# Patient Record
Sex: MALE | Race: WHITE | URBAN - METROPOLITAN AREA
[De-identification: names, ages, dates, MRNs, and addresses within clinical notes are randomized per-mention and may not be internally consistent; named-entity substitution may affect disease eponyms.]

---

## 2017-09-29 ENCOUNTER — OFFICE VISIT (OUTPATIENT)
Dept: FAMILY MEDICINE | Facility: CLINIC | Age: 42
End: 2017-09-29
Payer: COMMERCIAL

## 2017-09-29 ENCOUNTER — RADIANT APPOINTMENT (OUTPATIENT)
Dept: GENERAL RADIOLOGY | Facility: CLINIC | Age: 42
End: 2017-09-29
Attending: FAMILY MEDICINE
Payer: COMMERCIAL

## 2017-09-29 VITALS
HEIGHT: 71 IN | TEMPERATURE: 97.9 F | WEIGHT: 236 LBS | SYSTOLIC BLOOD PRESSURE: 132 MMHG | DIASTOLIC BLOOD PRESSURE: 94 MMHG | BODY MASS INDEX: 33.04 KG/M2 | HEART RATE: 76 BPM

## 2017-09-29 DIAGNOSIS — E66.09 NON MORBID OBESITY DUE TO EXCESS CALORIES: ICD-10-CM

## 2017-09-29 DIAGNOSIS — S93.401A SPRAIN OF RIGHT ANKLE, UNSPECIFIED LIGAMENT, INITIAL ENCOUNTER: ICD-10-CM

## 2017-09-29 DIAGNOSIS — Z72.0 TOBACCO ABUSE: ICD-10-CM

## 2017-09-29 DIAGNOSIS — D29.0 NEVUS OF SHAFT OF PENIS: ICD-10-CM

## 2017-09-29 DIAGNOSIS — Z23 NEED FOR VACCINATION: ICD-10-CM

## 2017-09-29 DIAGNOSIS — M61.40 HETEROTOPIC CALCIFICATION, POSTOPERATIVE: ICD-10-CM

## 2017-09-29 DIAGNOSIS — Z00.00 ROUTINE GENERAL MEDICAL EXAMINATION AT A HEALTH CARE FACILITY: Primary | ICD-10-CM

## 2017-09-29 DIAGNOSIS — R03.0 ELEVATED BLOOD PRESSURE READING WITHOUT DIAGNOSIS OF HYPERTENSION: ICD-10-CM

## 2017-09-29 DIAGNOSIS — N52.9 ERECTILE DYSFUNCTION, UNSPECIFIED ERECTILE DYSFUNCTION TYPE: ICD-10-CM

## 2017-09-29 DIAGNOSIS — Z13.6 CARDIOVASCULAR SCREENING; LDL GOAL LESS THAN 130: ICD-10-CM

## 2017-09-29 DIAGNOSIS — Z23 NEED FOR PROPHYLACTIC VACCINATION AND INOCULATION AGAINST INFLUENZA: ICD-10-CM

## 2017-09-29 DIAGNOSIS — M96.89 HETEROTOPIC CALCIFICATION, POSTOPERATIVE: ICD-10-CM

## 2017-09-29 PROCEDURE — 90471 IMMUNIZATION ADMIN: CPT | Performed by: FAMILY MEDICINE

## 2017-09-29 PROCEDURE — 90732 PPSV23 VACC 2 YRS+ SUBQ/IM: CPT | Performed by: FAMILY MEDICINE

## 2017-09-29 PROCEDURE — 36415 COLL VENOUS BLD VENIPUNCTURE: CPT | Performed by: FAMILY MEDICINE

## 2017-09-29 PROCEDURE — 99213 OFFICE O/P EST LOW 20 MIN: CPT | Mod: 25 | Performed by: FAMILY MEDICINE

## 2017-09-29 PROCEDURE — 90715 TDAP VACCINE 7 YRS/> IM: CPT | Performed by: FAMILY MEDICINE

## 2017-09-29 PROCEDURE — 84443 ASSAY THYROID STIM HORMONE: CPT | Performed by: FAMILY MEDICINE

## 2017-09-29 PROCEDURE — 90472 IMMUNIZATION ADMIN EACH ADD: CPT | Performed by: FAMILY MEDICINE

## 2017-09-29 PROCEDURE — 99386 PREV VISIT NEW AGE 40-64: CPT | Mod: 25 | Performed by: FAMILY MEDICINE

## 2017-09-29 PROCEDURE — 90686 IIV4 VACC NO PRSV 0.5 ML IM: CPT | Performed by: FAMILY MEDICINE

## 2017-09-29 PROCEDURE — 80061 LIPID PANEL: CPT | Performed by: FAMILY MEDICINE

## 2017-09-29 PROCEDURE — 80053 COMPREHEN METABOLIC PANEL: CPT | Performed by: FAMILY MEDICINE

## 2017-09-29 PROCEDURE — 73610 X-RAY EXAM OF ANKLE: CPT | Mod: RT

## 2017-09-29 RX ORDER — SILDENAFIL CITRATE 20 MG/1
TABLET ORAL
Qty: 30 TABLET | Refills: 11 | Status: SHIPPED | OUTPATIENT
Start: 2017-09-29

## 2017-09-29 NOTE — MR AVS SNAPSHOT
After Visit Summary   9/29/2017    Richard Bella    MRN: [id number]           Patient Information     Date Of Birth          1975        Visit Information        Provider Department      9/29/2017 2:30 PM Ryne Thomason MD [facility]        Today's Diagnoses     Routine general medical examination at a health care facility    -  1    Non morbid obesity due to excess calories        Tobacco abuse        CARDIOVASCULAR SCREENING; LDL GOAL LESS THAN 130        Sprain of right ankle, unspecified ligament, initial encounter        Erectile dysfunction, unspecified erectile dysfunction type        Need for vaccination        Need for prophylactic vaccination and inoculation against influenza        Heterotopic calcification, postoperative        Elevated blood pressure reading without diagnosis of hypertension        Melanocytic nevus of hip, unspecified laterality          Care Instructions      Preventive Health Recommendations  Male Ages 40 to 49    Yearly exam:             See your health care provider every year in order to  o   Review health changes.   o   Discuss preventive care.    o   Review your medicines if your doctor has prescribed any.    You should be tested each year for STDs (sexually transmitted diseases) if you re at risk.     Have a cholesterol test every 5 years.     Have a colonoscopy (test for colon cancer) if someone in your family has had colon cancer or polyps before age 50.     After age 45, have a diabetes test (fasting glucose). If you are at risk for diabetes, you should have this test every 3 years.      Talk with your health care provider about whether or not a prostate cancer screening test (PSA) is right for you.    Shots: Get a flu shot each year. Get a tetanus shot every 10 years.     Nutrition:    Eat at least 5 servings of fruits and vegetables daily.     Eat whole-grain bread, whole-wheat pasta and brown rice instead of white grains and rice.      Talk to your provider about Calcium and Vitamin D.     Lifestyle    Exercise for at least 150 minutes a week (30 minutes a day, 5 days a week). This will help you control your weight and prevent disease.     Limit alcohol to one drink per day.     No smoking.     Wear sunscreen to prevent skin cancer.     See your dentist every six months for an exam and cleaning.    30 grams fiber (3 servings vegetables /  fruits each meal)  1 serving carbohydrate (bread potatoes) daily            Follow-ups after your visit        Additional Services     DERMATOLOGY REFERRAL       Your provider has referred you to: [facility]: [facility] [phone]   [email or web address]    Please be aware that coverage of these services is subject to the terms and limitations of your health insurance plan.  Call member services at your health plan with any benefit or coverage questions.      Please bring the following with you to your appointment:    (1) Any X-Rays, CTs or MRIs which have been performed.  Contact the facility where they were done to arrange for  prior to your scheduled appointment.    (2) List of current medications  (3) This referral request   (4) Any documents/labs given to you for this referral            ORTHO  REFERRAL       [facility] is referring you to the Orthopedic  Services at [facility].       The  Representative will assist you in the coordination of your Orthopedic and Musculoskeletal Care as prescribed by your physician.    The  Representative will call you within 1 business day to help schedule your appointment, or you may contact the  Representative at:    All areas ~ [phone]     Type of Referral : Non Surgical       Timeframe requested: 3 - 5 days    Coverage of these services is subject to the terms and limitations of your health insurance plan.  Please  "call member services at your health plan with any benefit or coverage questions.      If X-rays, CT or MRI's have been performed, please contact the facility where they were done to arrange for , prior to your scheduled appointment.  Please bring this referral request to your appointment and present it to your specialist.                  Who to contact     If you have questions or need follow up information about today's clinic visit or your schedule please contact [facility] directly at [phone].  Normal or non-critical lab and imaging results will be communicated to you by MyChart, letter or phone within 4 business days after the clinic has received the results. If you do not hear from us within 7 days, please contact the clinic through [facility] or phone. If you have a critical or abnormal lab result, we will notify you by phone as soon as possible.  Submit refill requests through [employer] or call your pharmacy and they will forward the refill request to us. Please allow 3 business days for your refill to be completed.          Additional Information About Your Visit        [facility] Information     [employer] lets you send messages to your doctor, view your test results, renew your prescriptions, schedule appointments and more. To sign up, go to [email or web address] . Click on \"Log in\" on the left side of the screen, which will take you to the Welcome page. Then click on \"Sign up Now\" on the right side of the page.     You will be asked to enter the access code listed below, as well as some personal information. Please follow the directions to create your username and password.     Your access code is: [email or web address]  Expires: 2017  3:50 PM     Your access code will  in 90 days. If you need help or a new code, please call your [facility] or [phone].        Care EveryWhere ID     This is your Care EveryWhere ID. This could be used by other organizations to access your " "[place] medical records  [id number]        Your Vitals Were     Pulse Temperature Height BMI (Body Mass Index)          76 97.9  F (36.6  C) (Oral) 5' 11\" (1.803 m) 32.92 kg/m2         Blood Pressure from Last 3 Encounters:   09/29/17 (!) 132/94   03/16/11 128/78   07/21/10 130/78    Weight from Last 3 Encounters:   09/29/17 236 lb (107 kg)   03/16/11 218 lb (98.9 kg)   07/21/10 263 lb (119.3 kg)              We Performed the Following     Comprehensive metabolic panel     DERMATOLOGY REFERRAL     FLU VAC, SPLIT VIRUS IM > 3 YO (QUADRIVALENT) [48380]     Lipid panel reflex to direct LDL Non-fasting     ORTHO  REFERRAL     Pneumococcal vaccine 23 valent  PPSV23 (Pneumovax) [16513]     TDAP VACCINE (ADACEL)     TSH with free T4 reflex          Today's Medication Changes          These changes are accurate as of: 9/29/17  3:57 PM.  If you have any questions, ask your nurse or doctor.               Start taking these medicines.        Dose/Directions    sildenafil 20 MG tablet   Commonly known as:  REVATIO   Used for:  Erectile dysfunction, unspecified erectile dysfunction type   Started by:  Ryne Thomason MD        Take 1-3 qd prn Never use with nitroglycerin, terazosin or doxazosin.   Quantity:  30 tablet   Refills:  11            Where to get your medicines      These medications were sent to [facility] [address] [place] [address] [address] AT [place] [address] [address]  [address], [facility] [address]     Phone:  [phone]     sildenafil 20 MG tablet                Primary Care Provider Office Phone # Fax #    Ryne Thomason [phone] [address] [facility] [address]        Equal Access to Services     NITHIN SWAN AH: Alexis Harry, waaxda luqadaha, qaybta kaalmada timothy, rony fong. So [facility] [phone].    ATENCIÓN: Si habla español, tiene a marie disposición servicios gratuitos de " asistencia lingüística. Richardson al [phone].    We comply with applicable federal civil rights laws and [place] laws. We do not discriminate on the basis of race, color, national origin, age, disability, sex, sexual orientation, or gender identity.            Thank you!     Thank you for choosing [facility]  for your care. Our goal is always to provide you with excellent care. Hearing back from our patients is one way we can continue to improve our services. Please take a few minutes to complete the written survey that you may receive in the mail after your visit with us. Thank you!             Your Updated Medication List - Protect others around you: Learn how to safely use, store and throw away your medicines at www.disposemymeds.org.          This list is accurate as of: 9/29/17  3:57 PM.  Always use your most recent med list.                   Brand Name Dispense Instructions for use Diagnosis    sildenafil 20 MG tablet    REVATIO    30 tablet    Take 1-3 qd prn Never use with nitroglycerin, terazosin or doxazosin.    Erectile dysfunction, unspecified erectile dysfunction type       sildenafil 50 MG tablet    VIAGRA    10 tablet    Take 1 tablet by mouth daily as needed for erectile dysfunction.    Erectile dysfunction

## 2017-09-29 NOTE — LETTER
October 3, 2017      Richard Bella  [address] [facility] [place] [address]        Dear ,    We are writing to inform you of your test results.    Tests are all good. Your heart attack risk remains low. High HDL, the good stuff     Resulted Orders   Lipid panel reflex to direct LDL Non-fasting   Result Value Ref Range    Cholesterol 216 (H) <200 mg/dL      Comment:      Desirable:       <200 mg/dl    Triglycerides 237 (H) <150 mg/dL      Comment:      Borderline high:  150-199 mg/dl  High:             200-499 mg/dl  Very high:       >499 mg/dl  Fasting specimen      HDL Cholesterol 64 >39 mg/dL    LDL Cholesterol Calculated 105 (H) <100 mg/dL      Comment:      Above desirable:  100-129 mg/dl  Borderline High:  130-159 mg/dL  High:             160-189 mg/dL  Very high:       >189 mg/dl      Non HDL Cholesterol 152 (H) <130 mg/dL      Comment:      Above Desirable:  130-159 mg/dl  Borderline high:  160-189 mg/dl  High:             190-219 mg/dl  Very high:       >219 mg/dl     TSH with free T4 reflex   Result Value Ref Range    TSH 1.63 0.40 - 4.00 mU/L   Comprehensive metabolic panel   Result Value Ref Range    Sodium 139 133 - 144 mmol/L    Potassium 4.3 3.4 - 5.3 mmol/L    Chloride 106 94 - 109 mmol/L    Carbon Dioxide 27 20 - 32 mmol/L    Anion Gap 6 3 - 14 mmol/L    Glucose 88 70 - 99 mg/dL      Comment:      Fasting specimen    Urea Nitrogen 10 7 - 30 mg/dL    Creatinine 0.95 0.66 - 1.25 mg/dL    GFR Estimate 87 >60 mL/min/1.7m2      Comment:      Non  GFR Calc    GFR Estimate If Black >90 >60 mL/min/1.7m2      Comment:       GFR Calc    Calcium 9.3 8.5 - 10.1 mg/dL    Bilirubin Total 0.6 0.2 - 1.3 mg/dL    Albumin 4.0 3.4 - 5.0 g/dL    Protein Total 7.5 6.8 - 8.8 g/dL    Alkaline Phosphatase 96 40 - 150 U/L    ALT 61 0 - 70 U/L    AST 29 0 - 45 U/L       If you have any questions or concerns, please call the clinic at the number listed above.        Sincerely,        Ryne Thomason MD/lf

## 2017-09-29 NOTE — PROGRESS NOTES
"SUBJECTIVE:   CC: Richard Bella is an 41 year old male who presents for preventative health visit.     Routine general medical examination at a health care facility  (primary encounter diagnosis)  Physical   Annual:     Getting at least 3 servings of Calcium per day::  Yes    Bi-annual eye exam::  Yes    Dental care twice a year::  NO    Sleep apnea or symptoms of sleep apnea::  None    Diet::  Regular (no restrictions) and Vegetarian/vegan    Taking medications regularly::  Yes    Medication side effects::  Not applicable    Additional concerns today::  YES    Non morbid obesity due to excess calories: Patient lost weight with Atkins diet and exercise, says he has gained some back Body mass index is 32.92 kg/(m^2).      Tobacco abuse: Patient \"goes back and forth\" with quitting tobacco, smokes half a pack per day and chews nicotine gum    CARDIOVASCULAR SCREENING; LDL GOAL LESS THAN 130  Recent Labs   Lab Test  07/28/10   0822   CHOL  177   HDL  38*   LDL  102   TRIG  187*   CHOLHDLRATIO  4.7         Erectile dysfunction, unspecified erectile dysfunction type: Patient says viagra helps, would like Rx. Previous eval with borderline tetosterone      Need for prophylactic vaccination and inoculation against influenza: Will receive        Elevated blood pressure reading without diagnosis of hypertension  BP Readings from Last 6 Encounters:   09/29/17 (!) 132/94   03/16/11 128/78   07/21/10 130/78     Remains elevated on repeat    Today's PHQ-2 Score: PHQ-2 ( 1999 Pfizer) 9/29/2017   Q1: Little interest or pleasure in doing things 1   Q2: Feeling down, depressed or hopeless 0   PHQ-2 Score 1   Q1: Little interest or pleasure in doing things Several days   Q2: Feeling down, depressed or hopeless Not at all   PHQ-2 Score 1       Abuse: Current or Past(Physical, Sexual or Emotional)- No  Do you feel safe in your environment - Yes    Last PSA: No results found for: PSA    Reviewed orders with patient. Reviewed health " maintenance and updated orders accordingly - No      Reviewed and updated as needed this visit by clinical staff       Past Medical History:   Diagnosis Date     CARDIOVASCULAR SCREENING; LDL GOAL LESS THAN 130 10/31/2010     Non morbid obesity due to excess calories 9/29/2017       Past Surgical History:   Procedure Laterality Date     C NONSPECIFIC PROCEDURE      pyloric stenosis surgery as a child     LASIK BILATERAL       wisdom teeth         Family History   Problem Relation Age of Onset     Hypertension Maternal Grandmother      Arthritis Paternal Grandmother      DIABETES Paternal Grandfather      Arthritis Paternal Grandfather      HEART DISEASE Other      uncle       Social History   Substance Use Topics     Smoking status: Current Some Day Smoker     Packs/day: 0.50     Types: Cigarettes     Smokeless tobacco: Never Used      Comment: 10 a day     Alcohol use Yes      Comment: ocass.       Reviewed and updated as needed this visit by Provider          ROS:  E: NEGATIVE for vision changes or irritation  ENT: NEGATIVE for dysphagia  R: Positive for smoker's morning cough NEGATIVE for SOB  CV: NEGATIVE for chest pain, palpitations  GI: NEGATIVE for heartburn, or change in bowel habits   male: Some nocturia  M: see additional note  N: NEGATIVE for weakness, dizziness or paresthesias  S: see additional note  H: No bruising  P: NEGATIVE for changes in mood or affect    This document serves as a record of the services and decisions personally performed and made by Ryne Thomason MD. It was created on his behalf by Tanisha Mendiola, a trained medical scribe.  The creation of this document is based on the scribe's personal observations and the provider's statements to the medical scribe.  Tanisha Mendiola, September 29, 2017 2:59 PM    OBJECTIVE:   There were no vitals taken for this visit.   BP (!) 132/94 (BP Location: Right arm, Patient Position: Chair, Cuff Size: Adult Large)  Pulse 76  Temp 97.9  F (36.6  C) (Oral)  " Ht 5' 11\" (1.803 m)  Wt 236 lb (107 kg)  BMI 32.92 kg/m2      EXAM:  GENERAL: healthy, alert and no distress  EYES: Eyes grossly normal to inspection, PERRL and conjunctivae and sclerae normal  HENT: ear canals and TM's normal, nose and mouth without ulcers or lesions  NECK: no adenopathy, no asymmetry, masses, or scars and thyroid normal to palpation  RESP: lungs clear to auscultation - no rales, rhonchi or wheezes  CV: regular rate and rhythm, normal S1 S2, no S3 or S4, no murmur, click or rub, no peripheral edema and peripheral pulses strong  ABDOMEN: soft, nontender, no hepatosplenomegaly, no masses and bowel sounds normal  MS: see additional note  SKIN: no suspicious lesions or rashes  GY is not examined  PSYCH: mentation appears normal, affect normal/bright          ASSESSMENT/PLAN:   ASSESSMENT / PLAN:  (Z00.00) Routine general medical examination at a health care facility  (primary encounter diagnosis)  Comment: broaden database  Plan: Lipid panel reflex to direct LDL Non-fasting,         TSH with free T4 reflex, Comprehensive         metabolic panel            (E66.09) Non morbid obesity due to excess calories  Comment:  Discussed diet  Wt Readings from Last 5 Encounters:   09/29/17 107 kg (236 lb)   03/16/11 98.9 kg (218 lb)   07/21/10 119.3 kg (263 lb)         (Z72.0) Tobacco abuse  Comment: Contemplative  Discussed medical assistance      (Z13.6) CARDIOVASCULAR SCREENING; LDL GOAL LESS THAN 130  Comment: broaden database  Plan: Lipid panel reflex to direct LDL Non-fasting            (            (N52.9) Erectile dysfunction, unspecified erectile dysfunction type  Comment: fill  Plan: sildenafil (REVATIO) 20 MG tablet            (Z23) Need for vaccination  Comment: received  Plan: FLU VAC, SPLIT VIRUS IM > 3 YO (QUADRIVALENT)         [49225], TDAP VACCINE (ADACEL), Pneumococcal         vaccine 23 valent  PPSV23 (Pneumovax) [50349]            (Z23) Need for prophylactic vaccination and inoculation " against influenza  Comment: received  Plan: FLU VAC, SPLIT VIRUS IM > 3 YO (QUADRIVALENT)         [56743]                (R03.0) Elevated blood pressure reading without diagnosis of hypertension  Comment: collect BPs, recheck 1 month               RTC nurse/pharmacy  BPs      COUNSELING:   Reviewed preventive health counseling, as reflected in patient instructions       Healthy diet/nutrition       tobacco cessation    BP Screening:   Last 3 BP Readings:    BP Readings from Last 3 Encounters:   09/29/17 137/90   03/16/11 128/78   07/21/10 130/78       The following was recommended to the patient:  Anti-hypertensive phramacology therapy       reports that he has been smoking Cigarettes.  He has been smoking about 0.50 packs per day. He has never used smokeless tobacco.  Tobacco Cessation Action Plan: Advised to use nicotine gum  Estimated body mass index is 29.57 kg/(m^2) as calculated from the following:    Height as of 3/16/11: 6' (1.829 m).    Weight as of 3/16/11: 218 lb (98.9 kg).   Weight management plan: Discussed healthy diet and exercise guidelines and patient will follow up in 12 months in clinic to re-evaluate.    Counseling Resources:  ATP IV Guidelines  Pooled Cohorts Equation Calculator  FRAX Risk Assessment  ICSI Preventive Guidelines  Dietary Guidelines for Americans, 2010  USDA's MyPlate  ASA Prophylaxis  Lung CA Screening    Ryne Thomason MD  [facility]  Answers for HPI/ROS submitted by the patient on 9/29/2017   PHQ-2 Score: 1  The information in this document, created by the medical scribe for me, accurately reflects the services I personally performed and the decisions made by me. I have reviewed and approved this document for accuracy prior to leaving the patient care area.  Ryne Thomason MD September 29, 2017 2:58 PM    Injectable Influenza Immunization Documentation    1.  Is the person to be vaccinated sick today?   No    2. Does the person to be vaccinated have an allergy  to a component   of the vaccine?   No    3. Has the person to be vaccinated ever had a serious reaction   to influenza vaccine in the past?   No    4. Has the person to be vaccinated ever had Guillain-Barré syndrome?   No    Form completed by Lisandra Still MA

## 2017-09-30 PROBLEM — N52.9 ERECTILE DYSFUNCTION, UNSPECIFIED ERECTILE DYSFUNCTION TYPE: Status: ACTIVE | Noted: 2017-09-30

## 2017-09-30 PROBLEM — S93.401A SPRAIN OF RIGHT ANKLE, UNSPECIFIED LIGAMENT, INITIAL ENCOUNTER: Status: ACTIVE | Noted: 2017-09-30

## 2017-09-30 PROBLEM — D22.70: Status: ACTIVE | Noted: 2017-09-30

## 2017-09-30 PROBLEM — R03.0 ELEVATED BLOOD PRESSURE READING WITHOUT DIAGNOSIS OF HYPERTENSION: Status: ACTIVE | Noted: 2017-09-30

## 2017-09-30 PROBLEM — D29.0: Status: ACTIVE | Noted: 2017-09-30

## 2017-09-30 PROBLEM — D22.70: Status: RESOLVED | Noted: 2017-09-30 | Resolved: 2017-09-30

## 2017-09-30 LAB
ALBUMIN SERPL-MCNC: 4 G/DL (ref 3.4–5)
ALP SERPL-CCNC: 96 U/L (ref 40–150)
ALT SERPL W P-5'-P-CCNC: 61 U/L (ref 0–70)
ANION GAP SERPL CALCULATED.3IONS-SCNC: 6 MMOL/L (ref 3–14)
AST SERPL W P-5'-P-CCNC: 29 U/L (ref 0–45)
BILIRUB SERPL-MCNC: 0.6 MG/DL (ref 0.2–1.3)
BUN SERPL-MCNC: 10 MG/DL (ref 7–30)
CALCIUM SERPL-MCNC: 9.3 MG/DL (ref 8.5–10.1)
CHLORIDE SERPL-SCNC: 106 MMOL/L (ref 94–109)
CHOLEST SERPL-MCNC: 216 MG/DL
CO2 SERPL-SCNC: 27 MMOL/L (ref 20–32)
CREAT SERPL-MCNC: 0.95 MG/DL (ref 0.66–1.25)
GFR SERPL CREATININE-BSD FRML MDRD: 87 ML/MIN/1.7M2
GLUCOSE SERPL-MCNC: 88 MG/DL (ref 70–99)
HDLC SERPL-MCNC: 64 MG/DL
LDLC SERPL CALC-MCNC: 105 MG/DL
NONHDLC SERPL-MCNC: 152 MG/DL
POTASSIUM SERPL-SCNC: 4.3 MMOL/L (ref 3.4–5.3)
PROT SERPL-MCNC: 7.5 G/DL (ref 6.8–8.8)
SODIUM SERPL-SCNC: 139 MMOL/L (ref 133–144)
TRIGL SERPL-MCNC: 237 MG/DL
TSH SERPL DL<=0.005 MIU/L-ACNC: 1.63 MU/L (ref 0.4–4)

## 2017-09-30 NOTE — PROGRESS NOTES
"  Heterotopic calcification,  Sprain of right ankle, unspecified ligament, initial encounter pt reports 2 sprains this summer, persistent pain and lateral swelling in spite of OTC brace used faithfully  Nevus of shaft of penis he would like a dermatology referral. This he brings up at the end of our visit    ROS no bruising    BP (!) 132/94 (BP Location: Right arm, Patient Position: Chair, Cuff Size: Adult Large)  Pulse 76  Temp 97.9  F (36.6  C) (Oral)  Ht 5' 11\" (1.803 m)  Wt 236 lb (107 kg)  BMI 32.92 kg/m2  Right ankle lat malleolus swollen, neg ant drawer       IMPRESSION: Soft tissue swelling overlying the right lateral  malleolus. No fractures are seen in the right ankle. There is soft  tissue calcification overlying the lateral malleolus, possibly sequela  of prior remote trauma. Ankle mortise is congruent with preserved  joint space.  ASSESSMENT / PLAN:      (M96.89,  M61.40) Heterotopic calcification, Comment:suspect bone callus  Plan: ORTHO  REFERRAL           (S93.401A) Sprain of right ankle, unspecified ligament, initial encounter  Comment:I suspect a healed Fx, never immobilized  Plan: XR Ankle Right G/E 3 Views        refer    (D29.0) Nevus of shaft of penis  Comment: by Hx  Plan: DERMATOLOGY REFERRAL        refer          Ryne Thomason MD      "

## 2017-10-05 ENCOUNTER — OFFICE VISIT (OUTPATIENT)
Dept: ORTHOPEDICS | Facility: CLINIC | Age: 42
End: 2017-10-05
Payer: COMMERCIAL

## 2017-10-05 VITALS
BODY MASS INDEX: 33.04 KG/M2 | HEIGHT: 71 IN | WEIGHT: 236 LBS | SYSTOLIC BLOOD PRESSURE: 124 MMHG | DIASTOLIC BLOOD PRESSURE: 84 MMHG

## 2017-10-05 DIAGNOSIS — M76.71 PERONEAL TENDINITIS OF LOWER LEG, RIGHT: Primary | ICD-10-CM

## 2017-10-05 DIAGNOSIS — M61.50 MYOSITIS OSSIFICANS: ICD-10-CM

## 2017-10-05 PROCEDURE — 99243 OFF/OP CNSLTJ NEW/EST LOW 30: CPT | Performed by: FAMILY MEDICINE

## 2017-10-05 NOTE — NURSING NOTE
"Chief Complaint   Patient presents with     Ankle Pain       Initial /84  Ht 5' 11\" (1.803 m)  Wt 236 lb (107 kg)  BMI 32.92 kg/m2 Estimated body mass index is 32.92 kg/(m^2) as calculated from the following:    Height as of this encounter: 5' 11\" (1.803 m).    Weight as of this encounter: 236 lb (107 kg).  Medication Reconciliation: complete    "

## 2017-10-05 NOTE — Clinical Note
Here's an update on your patient, Richard Bella. Thank you for allowing me at [facility] [place] to be involved in the care of your patient. Please feel free to reach out to me on [employer], Epic Staff Message, or by phone at [phone].   Jonathon Hernandez MD [facility] [address] , [address] [place] [address] Phone: [phone] Fax: [phone]'

## 2017-10-05 NOTE — MR AVS SNAPSHOT
"              After Visit Summary   10/5/2017    Richard Bella    MRN: [id number]           Patient Information     Date Of Birth          1975        Visit Information        Provider Department      10/5/2017 4:00 PM Jonathon Hernandez MD [facility]        Today's Diagnoses     Peroneal tendinitis of lower leg, right    -  1    Myositis ossificans           Follow-ups after your visit        Who to contact     If you have questions or need follow up information about today's clinic visit or your schedule please contact [facility] directly at [phone].  Normal or non-critical lab and imaging results will be communicated to you by MyChart, letter or phone within 4 business days after the clinic has received the results. If you do not hear from us within 7 days, please contact the clinic through MyChart or phone. If you have a critical or abnormal lab result, we will notify you by phone as soon as possible.  Submit refill requests through [employer] or call your pharmacy and they will forward the refill request to us. Please allow 3 business days for your refill to be completed.          Additional Information About Your Visit        MyChart Information     [employer] lets you send messages to your doctor, view your test results, renew your prescriptions, schedule appointments and more. To sign up, go to [email or web address] . Click on \"Log in\" on the left side of the screen, which will take you to the Welcome page. Then click on \"Sign up Now\" on the right side of the page.     You will be asked to enter the access code listed below, as well as some personal information. Please follow the directions to create your username and password.     Your access code is: [email or web address]  Expires: 2017  3:50 PM     Your access code will  in 90 days. If you need help or a new code, please call your [place] clinic " "or [phone].        Care EveryWhere ID     This is your Care EveryWhere ID. This could be used by other organizations to access your [place] medical records  [id number]        Your Vitals Were     Height BMI (Body Mass Index)                5' 11\" (1.803 m) 32.92 kg/m2           Blood Pressure from Last 3 Encounters:   10/05/17 124/84   09/29/17 (!) 132/94   03/16/11 128/78    Weight from Last 3 Encounters:   10/05/17 236 lb (107 kg)   09/29/17 236 lb (107 kg)   03/16/11 218 lb (98.9 kg)              Today, you had the following     No orders found for display       Primary Care Provider Office Phone # Fax #    Ryne Thomason [phone] [address] [facility] [address]        Equal Access to Services     [facility]: Hadii aad ku hadasho Soomaali, waaxda luqadaha, qaybta kaalmada adeegyada, rony parkerin hayselinan abdulaziz her . So [facility] [phone].    ATENCIÓN: Si habla español, tiene a marie disposición servicios gratuitos de asistencia lingüística. Llame al [phone].    We comply with applicable federal civil rights laws and [place] laws. We do not discriminate on the basis of race, color, national origin, age, disability, sex, sexual orientation, or gender identity.            Thank you!     Thank you for choosing [facility]  for your care. Our goal is always to provide you with excellent care. Hearing back from our patients is one way we can continue to improve our services. Please take a few minutes to complete the written survey that you may receive in the mail after your visit with us. Thank you!             Your Updated Medication List - Protect others around you: Learn how to safely use, store and throw away your medicines at www.disposemymeds.org.          This list is accurate as of: 10/5/17 11:59 PM.  Always use your most recent med list.                   Brand Name Dispense Instructions for use Diagnosis    " sildenafil 20 MG tablet    REVATIO    30 tablet    Take 1-3 qd prn Never use with nitroglycerin, terazosin or doxazosin.    Erectile dysfunction, unspecified erectile dysfunction type       sildenafil 50 MG tablet    VIAGRA    10 tablet    Take 1 tablet by mouth daily as needed for erectile dysfunction.    Erectile dysfunction

## 2017-10-05 NOTE — PROGRESS NOTES
[facility]   [facility]   Clinic Visit s Oct 5, 2017    PCP: Ryne Thomason      Richard is a 41 year old male who is seen in consultation at the request of Dr. Thomason for   Chief Complaint   Patient presents with     Ankle Pain       Injury: Patient describes injury as fell off a ladder    Location of Pain: right lateral  Duration of Pain: 3 month(s)  Rating of Pain at worst: 5/10  Rating of Pain Currently: 2/10  Pain is worse with[place]  Pain is better with: rest  Treatment so far consists of: ibuprofen and ankle bracing  Associated symptoms: swelling and clicking  Prior History of related problems: none    Social History: works at sales rep     Past Medical History:   Diagnosis Date     CARDIOVASCULAR SCREENING; LDL GOAL LESS THAN 130 10/31/2010     Elevated blood pressure reading without diagnosis of hypertension 9/30/2017     Erectile dysfunction, unspecified erectile dysfunction type 9/30/2017     Melanocytic nevus of hip, unspecified laterality 9/30/2017     Nevus of shaft of penis 9/30/2017     Non morbid obesity due to excess calories 9/29/2017     Sprain of right ankle, unspecified ligament, initial encounter 9/30/2017       Patient Active Problem List    Diagnosis Date Noted     Sprain of right ankle, unspecified ligament, initial encounter 09/30/2017     Priority: Medium     Erectile dysfunction, unspecified erectile dysfunction type 09/30/2017     Priority: Medium     Nevus of shaft of penis 09/30/2017     Priority: Medium     Elevated blood pressure reading without diagnosis of hypertension 09/30/2017     Priority: Medium     Non morbid obesity due to excess calories 09/29/2017     Priority: Medium     CARDIOVASCULAR SCREENING; LDL GOAL LESS THAN 130 10/31/2010     Priority: Medium     Tobacco abuse 07/21/2010     Priority: Medium       Family History   Problem Relation Age of Onset     Hypertension Maternal Grandmother      Arthritis Paternal Grandmother       "DIABETES Paternal Grandfather      Arthritis Paternal Grandfather      HEART DISEASE Other      uncle       Social History     Social History     Marital status:      Spouse name: N/A     Number of children: N/A     Years of education: N/A     Social History Main Topics     Smoking status: Current Some Day Smoker     Packs/day: 0.50     Types: Cigarettes     Smokeless tobacco: Never Used      Comment: 10 a day     Alcohol use Yes      Comment: ocass.     Drug use: No       Past Surgical History:   Procedure Laterality Date     C NONSPECIFIC PROCEDURE      pyloric stenosis surgery as a child     LASIK BILATERAL       wisdom teeth         Review of Systems   Musculoskeletal: Positive for joint pain.   All other systems reviewed and are negative.        Physical Exam   Musculoskeletal:        Right knee: Normal.     /84  Ht 5' 11\" (1.803 m)  Wt 236 lb (107 kg)  BMI 32.92 kg/m2  Constitutional:well-developed, well-nourished, and in no distress.   Cardiovascular: Intact distal pulses.    Neurological: alert. Gait slight limp.  Skin: Skin is warm and dry.   Psychiatric: Mood and affect normal.   Respiratory: unlabored, speaks in full sentences  Lymph: no LAD, no lymphangitis      Right Ankle Exam   Swelling: Mild    Tenderness   The patient is experiencing tenderness in the lateral malleolus, posterior to lateral malleolus.    Range of Motion   Dorsiflexion:     Normal  Plantar flexion: Normal  Inversion:         Normal  Eversion:         Normal    Muscle Strength   Dorsiflexion:         5/5  Plantar flexion:     5/5  Anterior tibial:       5/5  Posterior tibial:     5/5  Gastrosoleus:      5/5  Peroneal muscle: 5/5    Tests   Anterior drawer: Negative  Varus tilt: Negative    Right Knee Exam   Right knee exam is normal.    Tenderness   None    Range of Motion   Normal right knee ROM    Muscle Strength   Normal right knee strength        Recent Results (from the past 744 hour(s))   XR Ankle Right G/E 3 " Views    Narrative    XR ANKLE RT G/E 3 VW 9/29/2017 3:41 PM    COMPARISON: None.    HISTORY: Right ankle ligament sprain.      Impression    IMPRESSION: Soft tissue swelling overlying the right lateral  malleolus. No fractures are seen in the right ankle. There is soft  tissue calcification overlying the lateral malleolus, possibly sequela  of prior remote trauma. Ankle mortise is congruent with preserved  joint space.    BENITO SHOEMAKER MD     ASSESSMENT/PLAN    ICD-10-CM    1. Peroneal tendinitis of lower leg, right M76.71    2. Myositis ossificans M61.50      Unusual calcification off lateral malleolus more consistent with MO following ligament and tendon injury from fall off ladder several months ago. Good ankle support with bracing, and heat to help dissolve MO recommended. consider PHYSICAL THERAPY. MRI discussed, not deemed necessary unless fails to resolve.

## 2023-02-21 NOTE — PATIENT INSTRUCTIONS
Preventive Health Recommendations  Male Ages 40 to 49    Yearly exam:             See your health care provider every year in order to  o   Review health changes.   o   Discuss preventive care.    o   Review your medicines if your doctor has prescribed any.    You should be tested each year for STDs (sexually transmitted diseases) if you re at risk.     Have a cholesterol test every 5 years.     Have a colonoscopy (test for colon cancer) if someone in your family has had colon cancer or polyps before age 50.     After age 45, have a diabetes test (fasting glucose). If you are at risk for diabetes, you should have this test every 3 years.      Talk with your health care provider about whether or not a prostate cancer screening test (PSA) is right for you.    Shots: Get a flu shot each year. Get a tetanus shot every 10 years.     Nutrition:    Eat at least 5 servings of fruits and vegetables daily.     Eat whole-grain bread, whole-wheat pasta and brown rice instead of white grains and rice.     Talk to your provider about Calcium and Vitamin D.     Lifestyle    Exercise for at least 150 minutes a week (30 minutes a day, 5 days a week). This will help you control your weight and prevent disease.     Limit alcohol to one drink per day.     No smoking.     Wear sunscreen to prevent skin cancer.     See your dentist every six months for an exam and cleaning.    30 grams fiber (3 servings vegetables /  fruits each meal)  1 serving carbohydrate (bread potatoes) daily    
Detail Level: Detailed